# Patient Record
Sex: MALE | Race: BLACK OR AFRICAN AMERICAN | NOT HISPANIC OR LATINO | URBAN - METROPOLITAN AREA
[De-identification: names, ages, dates, MRNs, and addresses within clinical notes are randomized per-mention and may not be internally consistent; named-entity substitution may affect disease eponyms.]

---

## 2020-06-22 ENCOUNTER — TELEPHONE (OUTPATIENT)
Dept: FAMILY MEDICINE CLINIC | Facility: CLINIC | Age: 25
End: 2020-06-22

## 2021-03-27 ENCOUNTER — IMMUNIZATIONS (OUTPATIENT)
Dept: FAMILY MEDICINE CLINIC | Facility: HOSPITAL | Age: 26
End: 2021-03-27

## 2021-03-27 DIAGNOSIS — Z23 ENCOUNTER FOR IMMUNIZATION: Primary | ICD-10-CM

## 2021-03-27 PROCEDURE — 0001A SARS-COV-2 / COVID-19 MRNA VACCINE (PFIZER-BIONTECH) 30 MCG: CPT

## 2021-03-27 PROCEDURE — 91300 SARS-COV-2 / COVID-19 MRNA VACCINE (PFIZER-BIONTECH) 30 MCG: CPT

## 2021-04-17 ENCOUNTER — IMMUNIZATIONS (OUTPATIENT)
Dept: FAMILY MEDICINE CLINIC | Facility: HOSPITAL | Age: 26
End: 2021-04-17

## 2021-04-17 DIAGNOSIS — Z23 ENCOUNTER FOR IMMUNIZATION: Primary | ICD-10-CM

## 2021-04-17 PROCEDURE — 91300 SARS-COV-2 / COVID-19 MRNA VACCINE (PFIZER-BIONTECH) 30 MCG: CPT

## 2021-04-17 PROCEDURE — 0002A SARS-COV-2 / COVID-19 MRNA VACCINE (PFIZER-BIONTECH) 30 MCG: CPT

## 2023-06-16 PROBLEM — L73.2 HIDRADENITIS SUPPURATIVA: Status: ACTIVE | Noted: 2023-06-16

## 2023-06-16 PROBLEM — R06.89 GASPING FOR BREATH: Status: ACTIVE | Noted: 2023-06-16

## 2023-06-16 PROBLEM — R06.83 SNORING: Status: ACTIVE | Noted: 2023-06-16

## 2023-06-16 PROBLEM — G47.19 EXCESSIVE DAYTIME SLEEPINESS: Status: ACTIVE | Noted: 2023-06-16

## 2023-06-16 PROBLEM — R12 HEARTBURN: Status: ACTIVE | Noted: 2023-06-16

## 2023-06-21 LAB
ALBUMIN SERPL-MCNC: 4.3 G/DL (ref 4.1–5.2)
ALBUMIN/GLOB SERPL: 1.3 {RATIO} (ref 1.2–2.2)
ALP SERPL-CCNC: 56 IU/L (ref 44–121)
ALT SERPL-CCNC: 124 IU/L (ref 0–44)
AST SERPL-CCNC: 38 IU/L (ref 0–40)
BASOPHILS # BLD AUTO: 0 X10E3/UL (ref 0–0.2)
BASOPHILS NFR BLD AUTO: 0 %
BILIRUB SERPL-MCNC: <0.2 MG/DL (ref 0–1.2)
BUN SERPL-MCNC: 12 MG/DL (ref 6–20)
BUN/CREAT SERPL: 12 (ref 9–20)
CALCIUM SERPL-MCNC: 9.5 MG/DL (ref 8.7–10.2)
CHLORIDE SERPL-SCNC: 103 MMOL/L (ref 96–106)
CHOLEST SERPL-MCNC: 210 MG/DL (ref 100–199)
CO2 SERPL-SCNC: 21 MMOL/L (ref 20–29)
CREAT SERPL-MCNC: 1.01 MG/DL (ref 0.76–1.27)
EGFR: 106 ML/MIN/1.73
EOSINOPHIL # BLD AUTO: 0.2 X10E3/UL (ref 0–0.4)
EOSINOPHIL NFR BLD AUTO: 3 %
ERYTHROCYTE [DISTWIDTH] IN BLOOD BY AUTOMATED COUNT: 13.6 % (ref 11.6–15.4)
GLOBULIN SER-MCNC: 3.2 G/DL (ref 1.5–4.5)
GLUCOSE SERPL-MCNC: 103 MG/DL (ref 70–99)
HCT VFR BLD AUTO: 41.8 % (ref 37.5–51)
HDLC SERPL-MCNC: 40 MG/DL
HGB BLD-MCNC: 13.8 G/DL (ref 13–17.7)
IMM GRANULOCYTES # BLD: 0 X10E3/UL (ref 0–0.1)
IMM GRANULOCYTES NFR BLD: 1 %
LDLC SERPL CALC-MCNC: 144 MG/DL (ref 0–99)
LYMPHOCYTES # BLD AUTO: 2.3 X10E3/UL (ref 0.7–3.1)
LYMPHOCYTES NFR BLD AUTO: 37 %
MCH RBC QN AUTO: 26.8 PG (ref 26.6–33)
MCHC RBC AUTO-ENTMCNC: 33 G/DL (ref 31.5–35.7)
MCV RBC AUTO: 81 FL (ref 79–97)
MONOCYTES # BLD AUTO: 0.5 X10E3/UL (ref 0.1–0.9)
MONOCYTES NFR BLD AUTO: 8 %
NEUTROPHILS # BLD AUTO: 3.2 X10E3/UL (ref 1.4–7)
NEUTROPHILS NFR BLD AUTO: 51 %
PLATELET # BLD AUTO: 264 X10E3/UL (ref 150–450)
POTASSIUM SERPL-SCNC: 4.4 MMOL/L (ref 3.5–5.2)
PROT SERPL-MCNC: 7.5 G/DL (ref 6–8.5)
RBC # BLD AUTO: 5.15 X10E6/UL (ref 4.14–5.8)
SL AMB VLDL CHOLESTEROL CALC: 26 MG/DL (ref 5–40)
SODIUM SERPL-SCNC: 138 MMOL/L (ref 134–144)
TRIGL SERPL-MCNC: 146 MG/DL (ref 0–149)
TSH SERPL DL<=0.005 MIU/L-ACNC: 1.55 UIU/ML (ref 0.45–4.5)
WBC # BLD AUTO: 6.1 X10E3/UL (ref 3.4–10.8)

## 2023-06-30 ENCOUNTER — TELEPHONE (OUTPATIENT)
Dept: FAMILY MEDICINE CLINIC | Facility: CLINIC | Age: 28
End: 2023-06-30

## 2023-06-30 DIAGNOSIS — E78.2 MIXED HYPERLIPIDEMIA: ICD-10-CM

## 2023-06-30 DIAGNOSIS — R74.01 ELEVATED ALT MEASUREMENT: ICD-10-CM

## 2023-06-30 DIAGNOSIS — R73.01 ELEVATED FASTING BLOOD SUGAR: ICD-10-CM

## 2023-06-30 DIAGNOSIS — R79.89 ELEVATED LFTS: Primary | ICD-10-CM

## 2023-06-30 NOTE — TELEPHONE ENCOUNTER
Reviewed with patient  Since he does not have access to this chart and it will be merged will place orders on the chart he has associated with isra Ramírez DO  [facility]  6/30/2023 12:46 PM

## 2023-06-30 NOTE — PROGRESS NOTES
Reviewed labs with patient  Merging chart since it wasp placed under a different name  Will order follow up labs for the patient  Lipid and a1c, and LFTs  More specific testing for LFTs needed  I believe this is a false positive        Ángela Ashton DO  [facility]  6/30/2023 12:47 PM

## 2023-07-05 ENCOUNTER — TELEPHONE (OUTPATIENT)
Dept: SLEEP CENTER | Facility: CLINIC | Age: 28
End: 2023-07-05

## 2023-07-05 NOTE — TELEPHONE ENCOUNTER
----- Message from Rajesh Bolton MD sent at 7/5/2023 11:10 AM EDT -----  Approved   ----- Message -----  From: Mechelle Gutierrez  Sent: 7/5/2023   7:58 AM EDT  To: Sleep Medicine [facility] Provider    This Home sleep study needs approval.     If approved please sign and return to clerical pool. If denied please include reasons why. Also provide alternative testing if warranted. Please sign and return to clerical pool.

## 2023-07-21 ENCOUNTER — TELEPHONE (OUTPATIENT)
Dept: FAMILY MEDICINE CLINIC | Facility: CLINIC | Age: 28
End: 2023-07-21

## 2023-07-21 DIAGNOSIS — R79.89 ABNORMAL LFTS: ICD-10-CM

## 2023-07-21 DIAGNOSIS — R74.01 ELEVATED ALT MEASUREMENT: Primary | ICD-10-CM

## 2023-07-21 LAB
ALBUMIN SERPL-MCNC: 4.4 G/DL (ref 4.3–5.2)
ALP SERPL-CCNC: 53 IU/L (ref 44–121)
ALT SERPL-CCNC: 103 IU/L (ref 0–44)
AST SERPL-CCNC: 40 IU/L (ref 0–40)
BILIRUB DIRECT SERPL-MCNC: <0.1 MG/DL (ref 0–0.4)
BILIRUB SERPL-MCNC: 0.3 MG/DL (ref 0–1.2)
CHOLEST SERPL-MCNC: 217 MG/DL (ref 100–199)
EST. AVERAGE GLUCOSE BLD GHB EST-MCNC: 126 MG/DL
GGT SERPL-CCNC: 55 IU/L (ref 0–65)
HAV AB SER QL IA: NEGATIVE
HBA1C MFR BLD: 6 % (ref 4.8–5.6)
HBV CORE AB SERPL QL IA: NEGATIVE
HBV SURFACE AB SER QL: NON REACTIVE
HBV SURFACE AG SERPL QL IA: NEGATIVE
HCV AB S/CO SERPL IA: NON REACTIVE
HDLC SERPL-MCNC: 39 MG/DL
INTERPRETATION: NORMAL
LDLC SERPL CALC-MCNC: 154 MG/DL (ref 0–99)
PROT SERPL-MCNC: 7.8 G/DL (ref 6–8.5)
RFX TO HBC IGM: NORMAL
SL AMB INTERPRETATION: NORMAL
SL AMB VLDL CHOLESTEROL CALC: 24 MG/DL (ref 5–40)
TRIGL SERPL-MCNC: 130 MG/DL (ref 0–149)

## 2023-07-21 NOTE — TELEPHONE ENCOUNTER
Called and left message on phone about his labs. Overall his ALT is still elevated. Hepatitis panel is negative. He has prediabetes, and continued elevated lipids. Therefore I recommend we send to the GI doctor. I will place referral and and requested he follow up in office to discuss.      Leia Hart,   [facility]  7/21/2023 6:16 PM

## 2023-07-25 ENCOUNTER — OFFICE VISIT (OUTPATIENT)
Dept: FAMILY MEDICINE CLINIC | Facility: CLINIC | Age: 28
End: 2023-07-25
Payer: COMMERCIAL

## 2023-07-25 VITALS
HEIGHT: 71 IN | BODY MASS INDEX: 34.72 KG/M2 | HEART RATE: 95 BPM | DIASTOLIC BLOOD PRESSURE: 82 MMHG | OXYGEN SATURATION: 99 % | WEIGHT: 248 LBS | SYSTOLIC BLOOD PRESSURE: 125 MMHG

## 2023-07-25 DIAGNOSIS — E78.2 MIXED HYPERLIPIDEMIA: ICD-10-CM

## 2023-07-25 DIAGNOSIS — R73.03 PREDIABETES: ICD-10-CM

## 2023-07-25 DIAGNOSIS — R73.01 ELEVATED FASTING BLOOD SUGAR: ICD-10-CM

## 2023-07-25 DIAGNOSIS — L73.2 HIDRADENITIS SUPPURATIVA: ICD-10-CM

## 2023-07-25 DIAGNOSIS — R79.89 ABNORMAL LFTS: ICD-10-CM

## 2023-07-25 DIAGNOSIS — R74.01 ELEVATED ALT MEASUREMENT: Primary | ICD-10-CM

## 2023-07-25 PROCEDURE — 99214 OFFICE O/P EST MOD 30 MIN: CPT | Performed by: FAMILY MEDICINE

## 2023-07-25 NOTE — PROGRESS NOTES
Review of labs. The patient recently had follow-up labs outpatient Note- Follow up     HPI:     Isaiah Hutchins II , 34 y.o. male  presents today for for elevated ALT, elevated fasting glucose, and hyperlipidemia. I personally reviewed all of the labs with the patient and discussed their significance. His ALT is significantly elevated without known cause. A chronic hepatitis panel and repeat of LFTs were performed. The hepatitis panel was negative. The LFTs still demonstrate isolated elevated ALT. we discussed that this could indicate underlying fatty liver disease, although it typically has other associated elevations in liver function tests. He has a mild elevation in his cholesterol as well. About 1 month ago his values were lower. He has been attempting over the last week to improve his diet and exercise. We also discussed his elevated fasting sugar and hemoglobin A1c of 6.0. This places him in the prediabetic range. The importance of diet control and avoidance of excessive carbs/cholesterol heavy foods was reviewed. Patient denies any abdominal pain, nausea, vomiting, lightheadedness, dizziness, chest pain, shortness of breath. Past Medical History:   Diagnosis Date   • GERD (gastroesophageal reflux disease) 2021    Heartbun   • Hypertension    • Obesity       ROS:   Review of Systems   See HPI    OBJECTIVE  Vitals:    07/25/23 0931   BP: 125/82   Pulse: 95   SpO2: 99%        Physical Exam  Constitutional:       General: He is not in acute distress. Appearance: Normal appearance. He is obese. He is not ill-appearing, toxic-appearing or diaphoretic. HENT:      Head: Normocephalic and atraumatic. Cardiovascular:      Rate and Rhythm: Normal rate and regular rhythm. Heart sounds: Normal heart sounds. No murmur heard. No friction rub. No gallop. Pulmonary:      Effort: Pulmonary effort is normal. No respiratory distress. Breath sounds: Normal breath sounds. No stridor.  No wheezing, rhonchi or rales. Abdominal:      General: Bowel sounds are normal. There is no distension. Palpations: Abdomen is soft. Tenderness: There is no abdominal tenderness. Neurological:      Mental Status: He is alert. Motor: No weakness. ASSESSMENT AND PLAN   Paula Leonard was seen today for follow-up. Diagnoses and all orders for this visit:    Elevated ALT measurement  Abnormal LFTs  Unknown cause to isolated elevation in ALT. It is possible that it could be early fatty liver disease, but typically there are other elevated values and liver function test.  I have already referred the patient over to GI. Ultrasound ordered. Depending on the timing of his appointment with GI may consider waiting to obtain ultrasound until seen by GI.  -     US abdomen limited; Future    Mixed hyperlipidemia  Elevated fasting blood sugar  Prediabetes  Follow-up in 3 months. The patient is going to focus on reducing carbohydrates and cholesterol. He is interested in avoidance of pharmacological treatment and medication in general.  I did discuss the importance of reduction in these values, otherwise we will likely need to start treatment.  -     HEMOGLOBIN A1C W/ EAG ESTIMATION; Future  -     Lipid panel; Future    Hidradenitis suppurativa  Stable currently. The patient has 1 small area in the left axilla. Thankfully it is not large and/or tracking. I informed him he may use the MetroGel to treat the areas prior to getting more infected or enlarged. This could also be scar tissue depending on if it improves with gel.        Katia Garnica DO  [facility]  7/25/2023 12:48 PM

## 2023-07-26 ENCOUNTER — TELEPHONE (OUTPATIENT)
Dept: ADMINISTRATIVE | Facility: HOSPITAL | Age: 28
End: 2023-07-26

## 2023-07-26 NOTE — TELEPHONE ENCOUNTER
Alejo Alba! I reached out to patient to get his US scheduled. Patient was a bit unsure about the screening because he was also referred to gastro. Patient mentioned about holding off on US until after he sees gastro. I advised patient that sometimes getting this type of screening prior to seeing gastro would be beneficial to them but he wanted me to follow up with you whether that be a good idea. Would you like for patient to get US prior to seeing gastro or see gastro and let that provider determine if US is needed? Please advise and let me know at your earliest convenience! Thanks!   Gaudencio Márquez

## 2023-08-06 ENCOUNTER — TELEPHONE (OUTPATIENT)
Dept: FAMILY MEDICINE CLINIC | Facility: CLINIC | Age: 28
End: 2023-08-06

## 2023-08-06 DIAGNOSIS — E78.2 MIXED HYPERLIPIDEMIA: ICD-10-CM

## 2023-08-06 DIAGNOSIS — R79.89 ABNORMAL LFTS: ICD-10-CM

## 2023-08-06 DIAGNOSIS — K75.4 AUTOIMMUNE HEPATITIS (HCC): ICD-10-CM

## 2023-08-06 DIAGNOSIS — R74.01 ELEVATED ALT MEASUREMENT: Primary | ICD-10-CM

## 2023-08-06 NOTE — TELEPHONE ENCOUNTER
Called patient and left VM. Informed patient of recommended imaging and labwork. These labs and imaging were requested by the GI specialist.  I am assuming that many of the labs have to do with specific liver dysfunction including autoimmune issues/ autoimmune hepatitis.   Will place orders so patient is ready to be seen by specialist.       Estanislado Riedel, DO  [facility]  8/6/2023 12:46 PM

## 2023-08-06 NOTE — TELEPHONE ENCOUNTER
----- Message from Jeanmarie Naidu MD sent at 7/31/2023  6:02 PM EDT -----  Thanks for reaching out. Can you please send an VIDAL, anti-mitochondrial antibody, anti-smooth muscle antibody, IgG/IgM/IgA, alpha-1-antitrypsin levels, alpha-1-antitrypsin phenotype, iron studies with ferritin, ceruloplasmin, acute hepatitis panel, hepatitis A IgG and hepatitis B surface antibody. Also TSH, lipid panel and A1c if not sent previously. I recommend a complete abdominal ultrasound with dopplers instead of the limited. Jaqueline Jeanette    ----- Message -----  From: Valerie Maldonado DO  Sent: 7/26/2023   6:20 PM EDT  To: Jeanmarie Naidu MD    Good evening Dr. Yobani Grayson,     I hope this message finds you well. I have a patient coming to see you in several weeks and wanted to see if there were any other labs or imaging that may be helpful. He has a significantly elevated isolated ALT. I was going to send him for a limited ultrasound but if there is a different protocol or type of examination that is preferred please let me know. Thank you for your time and effort in advance.       Sincerely,     Dr. Alfonzo Armenta DO

## 2023-08-08 ENCOUNTER — HOSPITAL ENCOUNTER (OUTPATIENT)
Dept: SLEEP CENTER | Facility: CLINIC | Age: 28
Discharge: HOME | End: 2023-08-08
Payer: COMMERCIAL

## 2023-08-08 DIAGNOSIS — R06.83 SNORING: ICD-10-CM

## 2023-08-08 DIAGNOSIS — G47.19 EXCESSIVE DAYTIME SLEEPINESS: ICD-10-CM

## 2023-08-08 DIAGNOSIS — R06.89 GASPING FOR BREATH: ICD-10-CM

## 2023-08-08 PROCEDURE — G0399 HOME SLEEP TEST/TYPE 3 PORTA: HCPCS

## 2023-08-09 NOTE — PROGRESS NOTES
Home Sleep Study Documentation    HOME STUDY DEVICE: Noxturnal no                                           Milli G3 yes      Pre-Sleep Home Study:    Set-up and instructions performed by: Dheeraj Delarosa performed demonstration for Patient: yes    Return demonstration performed by Patient: yes    Written instructions provided to Patient: yes    Patient signed consent form: yes        Post-Sleep Home Study:    Additional comments by Patient:     Home Sleep Study Failed:no:    Failure reason: N/A    Reported or Detected: N/A    Scored by:  PAT Patel

## 2023-08-13 ENCOUNTER — PATIENT MESSAGE (OUTPATIENT)
Dept: FAMILY MEDICINE CLINIC | Facility: CLINIC | Age: 28
End: 2023-08-13

## 2023-08-15 ENCOUNTER — APPOINTMENT (OUTPATIENT)
Dept: LAB | Facility: HOSPITAL | Age: 28
End: 2023-08-15
Payer: COMMERCIAL

## 2023-08-15 DIAGNOSIS — K75.4 AUTOIMMUNE HEPATITIS (HCC): ICD-10-CM

## 2023-08-15 DIAGNOSIS — R79.89 ABNORMAL LFTS: ICD-10-CM

## 2023-08-15 DIAGNOSIS — R74.01 ELEVATED ALT MEASUREMENT: ICD-10-CM

## 2023-08-15 DIAGNOSIS — E78.2 MIXED HYPERLIPIDEMIA: ICD-10-CM

## 2023-08-15 LAB
FERRITIN SERPL-MCNC: 81 NG/ML (ref 24–336)
HAV IGM SER QL: NORMAL
HBV CORE IGM SER QL: NORMAL
HBV SURFACE AB SER-ACNC: <3 MIU/ML
HBV SURFACE AG SER QL: NORMAL
HCV AB SER QL: NORMAL
IRON SATN MFR SERPL: 19 % (ref 20–50)
IRON SERPL-MCNC: 67 UG/DL (ref 65–175)
TIBC SERPL-MCNC: 355 UG/DL (ref 250–450)

## 2023-08-15 PROCEDURE — 82103 ALPHA-1-ANTITRYPSIN TOTAL: CPT

## 2023-08-15 PROCEDURE — 86708 HEPATITIS A ANTIBODY: CPT

## 2023-08-15 PROCEDURE — 36415 COLL VENOUS BLD VENIPUNCTURE: CPT

## 2023-08-15 PROCEDURE — 82104 ALPHA-1-ANTITRYPSIN PHENO: CPT

## 2023-08-15 PROCEDURE — 86381 MITOCHONDRIAL ANTIBODY EACH: CPT

## 2023-08-15 PROCEDURE — 80074 ACUTE HEPATITIS PANEL: CPT

## 2023-08-15 PROCEDURE — 82390 ASSAY OF CERULOPLASMIN: CPT

## 2023-08-15 PROCEDURE — 86706 HEP B SURFACE ANTIBODY: CPT

## 2023-08-15 PROCEDURE — 83540 ASSAY OF IRON: CPT

## 2023-08-15 PROCEDURE — 82728 ASSAY OF FERRITIN: CPT

## 2023-08-15 PROCEDURE — 83550 IRON BINDING TEST: CPT

## 2023-08-15 PROCEDURE — 86038 ANTINUCLEAR ANTIBODIES: CPT

## 2023-08-15 PROCEDURE — 86015 ACTIN ANTIBODY EACH: CPT

## 2023-08-17 ENCOUNTER — APPOINTMENT (OUTPATIENT)
Dept: LAB | Facility: AMBULARY SURGERY CENTER | Age: 28
End: 2023-08-17

## 2023-08-17 ENCOUNTER — OFFICE VISIT (OUTPATIENT)
Dept: GASTROENTEROLOGY | Facility: AMBULARY SURGERY CENTER | Age: 28
End: 2023-08-17
Payer: COMMERCIAL

## 2023-08-17 VITALS
OXYGEN SATURATION: 100 % | SYSTOLIC BLOOD PRESSURE: 124 MMHG | HEIGHT: 71 IN | DIASTOLIC BLOOD PRESSURE: 80 MMHG | WEIGHT: 246 LBS | HEART RATE: 76 BPM | BODY MASS INDEX: 34.44 KG/M2

## 2023-08-17 DIAGNOSIS — E66.9 OBESITY (BMI 30.0-34.9): ICD-10-CM

## 2023-08-17 DIAGNOSIS — R73.03 PRE-DIABETES: ICD-10-CM

## 2023-08-17 DIAGNOSIS — E88.81 METABOLIC SYNDROME: ICD-10-CM

## 2023-08-17 DIAGNOSIS — R79.89 ABNORMAL LFTS: ICD-10-CM

## 2023-08-17 DIAGNOSIS — K76.0 NAFLD (NONALCOHOLIC FATTY LIVER DISEASE): Primary | ICD-10-CM

## 2023-08-17 LAB — CERULOPLASMIN SERPL-MCNC: 17.4 MG/DL (ref 16–31)

## 2023-08-17 PROCEDURE — 99204 OFFICE O/P NEW MOD 45 MIN: CPT | Performed by: STUDENT IN AN ORGANIZED HEALTH CARE EDUCATION/TRAINING PROGRAM

## 2023-08-17 NOTE — PROGRESS NOTES
[facility] - Outpatient Consultation  Milli Perez II 34 y.o. male MRN: [id number]  Encounter: [id number]    PCP:  Joe Santos, [phone]  Referring Provider:  Margie Sandoval, Marry, [phone]    Patient: Milli Perez II, 1995  Reason for Referral: abnormal liver tests     ASSESSMENT/PLAN:  34 y.o. male with history of HTN, GERD, HLD, preDM and class I obesity who presents for initial evaluation for abnormal liver tests. He has no acute liver specific complaints or clinical evidence of hepatic decompensation. He was noted to have elevated serum transaminases with peak . His liver synthetic function and platelets are otherwise normal.  He had an ultrasound to 2017 which showed mild hepatomegaly with hepatic steatosis. His initial serologic work-up was notable for low ceruloplasmin but otherwise negative for viral hepatitis. I suspect that he has NAFLD given his metabolic risk factors but will complete a serologic work-up to exclude other causes of liver disease. He should have a 24-hour urine copper collection to evaluate for Georges's given his young age and low ceruloplasmin. I would also recommend an ultrasound abdomen with elastography to stage his fibrosis. We discussed the natural history, prognosis, and complications of NAFLD, including progression to cirrhosis as well as risk for cardiovascular events. We discussed that weight loss of at least 5 to 10% of his body weight and aggressive modification of his metabolic risk factors are wooten in preventing progression to these. I also advised that he should avoid excessive EtOH consumption as well as hepatotoxic medications. If he is unsuccessful with weight loss, would consider GLP-1a therapy which has been shown to improve steatohepatitis and cause regression of fibrosis. He should return to clinic in 3 months or sooner if needed. Thank you for the opportunity to consult his care.     - HAV IgG, vaccinate if non-immune  - VIDAL, ASMA, AMA, and quantitative immunoglobulins  - A1AT levels and phenotype  - 24-hour urine copper collection  - Ultrasound abdomen with elastography    Macdonald Aschoff, MD  Division of Gastroenterology and Hepatology  [address]    ============================================================================  CC/HPI: 34 y.o. male with history of HTN, GERD, HLD, preDM and class I obesity who presents for initial evaluation for abnormal liver tests. He was noted to have abnormal liver test dating back to June 2023 with peak . His serologic work-up was negative for viral hepatitis but did show low ceruloplasmin. He had a prior ultrasound abdomen in 2017 which showed fatty infiltration. He denies excessive EtOH consumption and recreational drug use. He has no family of liver disease. He denies any OTC medications or herbal supplements. Regarding his weight, he has gained 20 lbs since graduating high school in 2015. ROS: Complete review of systems otherwise negative.      PAST MEDICAL/SURGICAL HISTORY:  Past Medical History:   Diagnosis Date   • GERD (gastroesophageal reflux disease) 2021    Heartbun   • Hypertension    • Obesity         Past Surgical History:   Procedure Laterality Date   • WISDOM TOOTH EXTRACTION         FAMILY/SOCIAL HISTORY:  Family History   Problem Relation Age of Onset   • No Known Problems Mother    • Diabetes Father    • Heart disease Father    • Prostate cancer Neg Hx    • Testicular cancer Neg Hx    • Colon cancer Neg Hx        Social History     Tobacco Use   • Smoking status: Never   • Smokeless tobacco: Never   • Tobacco comments:     Never smoked a cigarette   Vaping Use   • Vaping Use: Never used   Substance Use Topics   • Alcohol use: Yes     Comment: once a month, 1 drink   • Drug use: Never       MEDICATIONS:  Current Outpatient Medications on File Prior to Visit   Medication Sig Dispense Refill   • clindamycin (CLINDAGEL) 1 % gel Apply topically 2 (two) times a day (Patient not taking: Reported on 7/25/2023) 75 mL 0     No current facility-administered medications on file prior to visit. No Known Allergies    PHYSICAL EXAM:  /80 (BP Location: Left arm, Patient Position: Sitting, Cuff Size: Standard)   Pulse 76   Ht 5' 11" (1.803 m)   Wt 112 kg (246 lb)   SpO2 100%   BMI 34.31 kg/m²   GENERAL: NAD, AAO  HEENT: anicteric, OP clear, MMM  ABDOMEN: S/ND/NT, normoactive BS, no hepatomegaly, spleen not palpable  EXTREMITIES: no edema  SKIN: no rashes, no palmar erythema, no spider angiomata   NEURO: normal gait, no tremor, no asterixis     LABS/RADIOLOGY/ENDOSCOPY:  Lab Results   Component Value Date    WBC 6.1 06/20/2023    HGB 13.8 06/20/2023    HCT 41.8 06/20/2023     06/20/2023    BUN 12 06/20/2023    CREATININE 1.01 06/20/2023    K 4.4 06/20/2023     06/20/2023    CO2 21 06/20/2023     (HH) 07/20/2023    AST 40 07/20/2023    GLOB 3.2 06/20/2023    EGFR 106 06/20/2023    TRIG 130 07/20/2023    HDL 39 (L) 07/20/2023    GGT 55 07/20/2023     A1c 6.0  Ceruloplasmin 17.5  Ferritin 81  HAV IgM-  HBsAg-  HBcAb-  HBsAb-   HCV Ab-     US abdomen (5/2017)  Interpretation: Abdominal ultrasound. Transabdominal ultrasound was performed. Spleen is unremarkable. It measures 9.4 x 4.9 x 4.9 cm. The liver is mildly   prominent measuring 17.9 cm in the sagittal plane. No focal liver lesions are   identified. There is mild diffuse increased echogenicity consistent with fatty   infiltration. Gallbladder is unremarkable. No gallstones or wall thickening. Common bile that   measures 3 mm. Visualized portions of the pancreas, aorta and IVC are   unremarkable. Kidneys are normal in size and echotexture. No hydronephrosis. Cortices are   normal thickness. Left kidney measures 11 cm longitudinally. The right kidney   measures 10.6 cm longitudinally. Computed MELD 3.0 unavailable.  Necessary lab results were not found in the last year. Computed MELD-Na unavailable. Necessary lab results were not found in the last year.

## 2023-08-18 LAB
ACTIN IGG SERPL-ACNC: 13 UNITS (ref 0–19)
ANA HOMOGEN TITR SER: ABNORMAL {TITER}
ANA TITR SER IF: POSITIVE {TITER}
MITOCHONDRIA M2 IGG SER-ACNC: <20 UNITS (ref 0–20)
SL AMB NOTE:: ABNORMAL

## 2023-08-19 ENCOUNTER — APPOINTMENT (OUTPATIENT)
Dept: LAB | Facility: CLINIC | Age: 28
End: 2023-08-19
Payer: COMMERCIAL

## 2023-08-19 DIAGNOSIS — R79.89 ABNORMAL LFTS: ICD-10-CM

## 2023-08-19 DIAGNOSIS — K76.0 NAFLD (NONALCOHOLIC FATTY LIVER DISEASE): ICD-10-CM

## 2023-08-19 LAB
ANA SER QL IA: NEGATIVE
HAV AB SER QL IA: NORMAL
IGA SERPL-MCNC: 224 MG/DL (ref 70–400)
IGG SERPL-MCNC: 1580 MG/DL (ref 700–1600)
IGM SERPL-MCNC: 220 MG/DL (ref 40–230)

## 2023-08-19 PROCEDURE — 82525 ASSAY OF COPPER: CPT

## 2023-08-19 PROCEDURE — 36415 COLL VENOUS BLD VENIPUNCTURE: CPT

## 2023-08-19 PROCEDURE — 86015 ACTIN ANTIBODY EACH: CPT

## 2023-08-19 PROCEDURE — 86038 ANTINUCLEAR ANTIBODIES: CPT

## 2023-08-19 PROCEDURE — 82784 ASSAY IGA/IGD/IGG/IGM EACH: CPT

## 2023-08-19 PROCEDURE — 82103 ALPHA-1-ANTITRYPSIN TOTAL: CPT

## 2023-08-19 PROCEDURE — 86708 HEPATITIS A ANTIBODY: CPT

## 2023-08-19 PROCEDURE — 86381 MITOCHONDRIAL ANTIBODY EACH: CPT

## 2023-08-19 PROCEDURE — 82570 ASSAY OF URINE CREATININE: CPT

## 2023-08-20 LAB — A1AT SERPL-MCNC: 120 MG/DL (ref 95–164)

## 2023-08-21 LAB
A1AT PHENOTYP SERPL IFE: NORMAL
A1AT SERPL-MCNC: 127 MG/DL (ref 95–164)
ACTIN IGG SERPL-ACNC: 16 UNITS (ref 0–19)
MITOCHONDRIA M2 IGG SER-ACNC: <20 UNITS (ref 0–20)

## 2023-08-22 LAB — MISCELLANEOUS LAB TEST RESULT: NORMAL

## 2023-08-23 LAB
COPPER 24H UR-MRATE: 22 UG/24 HR (ref 3–35)
COPPER UR-MCNC: 14 UG/L
COPPER/CREAT UR: 9 UG/G CREAT (ref 0–49)
CREAT UR-MCNC: 1.59 G/L (ref 0.3–3)

## 2023-08-23 NOTE — PROGRESS NOTES
Please print the information for the CPAP info through [employer] for patient with home sleep study. Please send to [employer]. Thank you.      Vamshi Damian DO  [facility]  8/23/2023 4:05 PM

## 2023-08-24 LAB

## 2023-08-26 ENCOUNTER — HOSPITAL ENCOUNTER (OUTPATIENT)
Dept: ULTRASOUND IMAGING | Facility: HOSPITAL | Age: 28
Discharge: HOME | End: 2023-08-26
Attending: STUDENT IN AN ORGANIZED HEALTH CARE EDUCATION/TRAINING PROGRAM
Payer: COMMERCIAL

## 2023-08-26 DIAGNOSIS — R79.89 ABNORMAL LFTS: ICD-10-CM

## 2023-08-26 DIAGNOSIS — K76.0 NAFLD (NONALCOHOLIC FATTY LIVER DISEASE): ICD-10-CM

## 2023-08-26 PROCEDURE — 76981 USE PARENCHYMA: CPT

## 2023-08-28 LAB

## 2023-09-05 ENCOUNTER — TELEPHONE (OUTPATIENT)
Dept: FAMILY MEDICINE CLINIC | Facility: CLINIC | Age: 28
End: 2023-09-05

## 2023-09-05 NOTE — TELEPHONE ENCOUNTER
Per message from doctor, I called patient to schedule a Hep A & B shot. Patient stated he was really busy right now but he would either call back or schedule an appointment on line.

## 2023-09-06 LAB

## 2023-09-16 LAB

## 2023-11-06 ENCOUNTER — OFFICE VISIT (OUTPATIENT)
Dept: FAMILY MEDICINE CLINIC | Facility: CLINIC | Age: 28
End: 2023-11-06
Payer: COMMERCIAL

## 2023-11-06 VITALS
HEART RATE: 85 BPM | SYSTOLIC BLOOD PRESSURE: 130 MMHG | BODY MASS INDEX: 34.3 KG/M2 | DIASTOLIC BLOOD PRESSURE: 80 MMHG | OXYGEN SATURATION: 98 % | WEIGHT: 245 LBS | HEIGHT: 71 IN

## 2023-11-06 DIAGNOSIS — R06.89 GASPING FOR BREATH: ICD-10-CM

## 2023-11-06 DIAGNOSIS — R06.83 SNORING: ICD-10-CM

## 2023-11-06 DIAGNOSIS — R73.03 PREDIABETES: ICD-10-CM

## 2023-11-06 DIAGNOSIS — G47.19 EXCESSIVE DAYTIME SLEEPINESS: ICD-10-CM

## 2023-11-06 DIAGNOSIS — E78.2 MIXED HYPERLIPIDEMIA: Primary | ICD-10-CM

## 2023-11-06 PROCEDURE — 99214 OFFICE O/P EST MOD 30 MIN: CPT | Performed by: FAMILY MEDICINE

## 2023-11-06 NOTE — PROGRESS NOTES
Outpatient Note- Follow up     HPI:     Terrance Morales II , 34 y.o. male  presents today for chronic conditions. The patient presents for follow up of elevated liver enzymes (possible autoimmune hepatitis from GI), hyperlipidemia, and elevated blood pressure. The patient remains in the 056'W systolic. He has been monitoring his diet and declines any recent chest pain, shortness of breath, nausea, vomiting, diarrhea, constipation, lightheadedness or dizziness. The patient has been compliant on CPAP. He has been doing better and feels more energetic despite busy schedule with school, work, and family. Past Medical History:   Diagnosis Date    GERD (gastroesophageal reflux disease) 2021    Heartbun    Hypertension     Obesity         ROS:   Review of Systems   See HPI    OBJECTIVE  Vitals:    11/06/23 0816   BP: 130/80   Pulse: 85   SpO2: 98%        Physical Exam  Constitutional:       General: He is not in acute distress. Appearance: Normal appearance. He is obese. He is not ill-appearing, toxic-appearing or diaphoretic. HENT:      Head: Normocephalic and atraumatic. Right Ear: Tympanic membrane, ear canal and external ear normal. There is no impacted cerumen. Left Ear: Tympanic membrane, ear canal and external ear normal. There is no impacted cerumen. Nose: Nose normal. No congestion or rhinorrhea. Mouth/Throat:      Mouth: Mucous membranes are moist.      Pharynx: Oropharynx is clear. No oropharyngeal exudate or posterior oropharyngeal erythema. Eyes:      General:         Right eye: No discharge. Left eye: No discharge. Pupils: Pupils are equal, round, and reactive to light. Cardiovascular:      Rate and Rhythm: Normal rate and regular rhythm. Heart sounds: Normal heart sounds. No murmur heard. No friction rub. No gallop. Pulmonary:      Effort: Pulmonary effort is normal. No respiratory distress. Breath sounds: Normal breath sounds. No stridor. No wheezing, rhonchi or rales. Abdominal:      General: Bowel sounds are normal. There is no distension. Palpations: Abdomen is soft. Tenderness: There is no abdominal tenderness. Neurological:      Mental Status: He is alert. ASSESSMENT AND PLAN   Kendell Hernandez was seen today for follow-up. Diagnoses and all orders for this visit:    Mixed hyperlipidemia  Last reviewed in 7/2023. Recommend follow up after change to diet. No current medication management. Patient likely had fatty liver. Hoping improvement present in values. -     Lipid panel; Future    Prediabetes  Last hemoglobin A1c is 6.0. Patient aware of prediabetic status. Has been working on diet. Recommended follow up after seeing hepatology since thy will likely require follow up labs. -     HEMOGLOBIN A1C W/ EAG ESTIMATION; Future    Excessive daytime sleepiness  Snoring  Gasping for breath  Evaluated patient's compliance on phone. He had a sleep score of 88 since sometimes he works overtime at night. The patient has not been referred to the sleep medicine doctor and will need to review compliance for insurance. Referral placed. -     Ambulatory Referral to Sleep Medicine; Future       Patient to follow up with hepatology for history of elevated ALT and follow up of NAFLD.          Saurav Javier DO  [facility]  11/6/2023 1:17 PM no

## 2024-12-17 NOTE — PATIENT INSTRUCTIONS
For patients with fatty liver disease, weight loss through diet and exercise is recommended. To MAINTAIN weight you must use up at least as many calories as you take in. To LOSE weight you must use up more calories than you take in. Start by knowing how many calories you should be eating and drinking to maintain your weight. Nutrition and calorie information on food labels is typically based on a 2,000 calorie diet. You may need fewer or more calories depending on several factors including age, gender, and level of physical activity. Increase the amount and intensity of your physical activity to match the number of calories you take in. Aim for at least 150 minutes of moderate physical activity or 75 minutes of vigorous physical activity - or an equal combination of both - each week. Regular physical activity can help you maintain your weight, keep off weight that you lose and help you reach physical and cardiovascular fitness. If it's hard to schedule regular exercise sessions, try aiming for sessions of at least 10 minutes spread throughout the week. As you make daily food choices, base your eating pattern on these recommendations:   Eat a variety of fresh, frozen and canned vegetables and fruits without high-calorie sauces or added salt and sugars. Replace high-calorie foods with fruits and vegetables. Choose fiber-rich whole grains for most grain servings. Choose poultry and fish without skin and prepare them in healthy ways without added saturated and trans fat. If you choose to eat meat, look for the leanest cuts available and prepare them in healthy and delicious ways. Eat a variety of fish at least twice a week, especially fish containing omega-3 fatty acids (for example, salmon, trout and herring). Select fat-free (skim) and low-fat (1%) dairy products. Avoid foods containing partially hydrogenated vegetable oils to reduce trans fat in your diet.    Limit saturated fat and trans fat and replace them with the better fats, monounsaturated and polyunsaturated. If you need to lower your blood cholesterol, reduce saturated fat to no more than 5 to 6 percent of total calories. For someone eating 2,000 calories a day, that's about 13 grams of saturated fat. Cut back on beverages and foods with added sugars. Choose foods with less sodium and prepare foods with little or no salt. To prevent fluid problems in the legs and abdomen aim to eat no more than 2,000 milligrams of sodium per day. If you can't meet these goals right now, even reducing sodium intake by 1,000 mg per day can benefit blood pressure. If you drink alcohol, drink in moderation. That means no more than one drink per day if you're a woman and no more than two drinks per day if you're a man. Please note that the effect of alcohol on NAFLD is unclear. Some studies show that moderate alcohol use may be beneficial, but others show no benefit and even harm. Discuss specific recommendations with your hepatologist.   Studies have shown that drinking up to 4 cups of caffeinated coffee per day may reduce progression of liver disease and development of liver cancer. If you have cirrhosis of the liver avoid raw shellfish such as oysters and clams. These can carry a specific bacteria that can be very harmful in liver disease    For more information on Fatty Liver Disease and Prevention please see this video from the [employer]: PBS-Bio.cy     For more information on healthy eating, please visit the American Heart Association website: TicketTuesday.uy. jsp     For tips on physical activity, please visit the American Heart Association website:   ConstitutionJournal.co.uk     If you are looking for additional local support, education or are ready to take action to end liver disease, contact the [employer]. The [employer] is the nation's leading nonprofit focusing on providing high-quality education and support services for the prevention, treatment and cure of over 100 liver diseases. You can learn more by visiting https://jauregui.net/. org/midatlantic/ no

## 2025-06-19 ENCOUNTER — TRANSCRIBE ORDERS (OUTPATIENT)
Dept: LAB | Facility: AMBULARY SURGERY CENTER | Age: 30
End: 2025-06-19

## 2025-06-19 ENCOUNTER — APPOINTMENT (OUTPATIENT)
Dept: LAB | Facility: AMBULARY SURGERY CENTER | Age: 30
End: 2025-06-19

## 2025-06-19 DIAGNOSIS — Z00.8 HEALTH EXAMINATION IN POPULATION SURVEY: Primary | ICD-10-CM

## 2025-06-19 DIAGNOSIS — Z00.8 HEALTH EXAMINATION IN POPULATION SURVEY: ICD-10-CM

## 2025-06-19 LAB
CHOLEST SERPL-MCNC: 187 MG/DL (ref ?–200)
EST. AVERAGE GLUCOSE BLD GHB EST-MCNC: 126 MG/DL
HBA1C MFR BLD: 6 %
HDLC SERPL-MCNC: 42 MG/DL
LDLC SERPL CALC-MCNC: 126 MG/DL (ref 0–100)
NONHDLC SERPL-MCNC: 145 MG/DL
TRIGL SERPL-MCNC: 94 MG/DL (ref ?–150)

## 2025-06-19 PROCEDURE — 36415 COLL VENOUS BLD VENIPUNCTURE: CPT

## 2025-06-19 PROCEDURE — 80061 LIPID PANEL: CPT

## 2025-06-19 PROCEDURE — 83036 HEMOGLOBIN GLYCOSYLATED A1C: CPT
